# Patient Record
Sex: MALE
[De-identification: names, ages, dates, MRNs, and addresses within clinical notes are randomized per-mention and may not be internally consistent; named-entity substitution may affect disease eponyms.]

---

## 2024-02-15 ENCOUNTER — NON-APPOINTMENT (OUTPATIENT)
Age: 24
End: 2024-02-15

## 2024-02-15 ENCOUNTER — APPOINTMENT (OUTPATIENT)
Dept: INTERNAL MEDICINE | Facility: CLINIC | Age: 24
End: 2024-02-15
Payer: COMMERCIAL

## 2024-02-15 ENCOUNTER — TRANSCRIPTION ENCOUNTER (OUTPATIENT)
Age: 24
End: 2024-02-15

## 2024-02-15 VITALS
OXYGEN SATURATION: 99 % | HEART RATE: 58 BPM | BODY MASS INDEX: 34.57 KG/M2 | HEIGHT: 75 IN | SYSTOLIC BLOOD PRESSURE: 151 MMHG | DIASTOLIC BLOOD PRESSURE: 90 MMHG | WEIGHT: 278 LBS | TEMPERATURE: 98.7 F

## 2024-02-15 DIAGNOSIS — Z80.7 FAMILY HISTORY OF OTHER MALIGNANT NEOPLASMS OF LYMPHOID, HEMATOPOIETIC AND RELATED TISSUES: ICD-10-CM

## 2024-02-15 DIAGNOSIS — Z76.89 PERSONS ENCOUNTERING HEALTH SERVICES IN OTHER SPECIFIED CIRCUMSTANCES: ICD-10-CM

## 2024-02-15 DIAGNOSIS — Z80.42 FAMILY HISTORY OF MALIGNANT NEOPLASM OF PROSTATE: ICD-10-CM

## 2024-02-15 DIAGNOSIS — Z80.3 FAMILY HISTORY OF MALIGNANT NEOPLASM OF BREAST: ICD-10-CM

## 2024-02-15 DIAGNOSIS — R21 RASH AND OTHER NONSPECIFIC SKIN ERUPTION: ICD-10-CM

## 2024-02-15 PROBLEM — Z00.00 ENCOUNTER FOR PREVENTIVE HEALTH EXAMINATION: Status: ACTIVE | Noted: 2024-02-15

## 2024-02-15 PROCEDURE — 99203 OFFICE O/P NEW LOW 30 MIN: CPT

## 2024-02-15 NOTE — HEALTH RISK ASSESSMENT
[Yes] : Yes [2 - 4 times a month (2 pts)] : 2-4 times a month (2 points) [3 or 4 (1 pt)] : 3 or 4  (1 point) [Less than monthly (1 pt)] : Less than monthly (1 point) [No] : In the past 12 months have you used drugs other than those required for medical reasons? No [Employed] : employed [Student] : student [Single] : single [Fully functional (bathing, dressing, toileting, transferring, walking, feeding)] : Fully functional (bathing, dressing, toileting, transferring, walking, feeding) [Fully functional (using the telephone, shopping, preparing meals, housekeeping, doing laundry, using] : Fully functional and needs no help or supervision to perform IADLs (using the telephone, shopping, preparing meals, housekeeping, doing laundry, using transportation, managing medications and managing finances) [Never] : Never [Audit-CScore] : 4 [Sexually Active] : not sexually active

## 2024-02-15 NOTE — PHYSICAL EXAM
[Normal] : normal rate, regular rhythm, normal S1 and S2 and no murmur heard [de-identified] : Scattered pustules in the inner thigh, one pustule above the groin

## 2024-02-15 NOTE — HISTORY OF PRESENT ILLNESS
[de-identified] : Mr Moore is a 23 YOM woth no knon PMHx. Reports ingrown hair in his groin for a few months. There is a small lump with no itching or pain. No other rash. No new soaps, skin care products.  He does occasionally feel down and depressed. He attributes this to studying and school. No thoughts of self harm, no Hx of self harm.

## 2024-04-01 ENCOUNTER — APPOINTMENT (OUTPATIENT)
Dept: INTERNAL MEDICINE | Facility: CLINIC | Age: 24
End: 2024-04-01
Payer: COMMERCIAL

## 2024-04-01 ENCOUNTER — NON-APPOINTMENT (OUTPATIENT)
Age: 24
End: 2024-04-01

## 2024-04-01 VITALS — SYSTOLIC BLOOD PRESSURE: 135 MMHG | DIASTOLIC BLOOD PRESSURE: 78 MMHG

## 2024-04-01 VITALS — SYSTOLIC BLOOD PRESSURE: 134 MMHG | DIASTOLIC BLOOD PRESSURE: 77 MMHG

## 2024-04-01 VITALS
WEIGHT: 265 LBS | SYSTOLIC BLOOD PRESSURE: 161 MMHG | DIASTOLIC BLOOD PRESSURE: 85 MMHG | TEMPERATURE: 98.1 F | BODY MASS INDEX: 32.95 KG/M2 | OXYGEN SATURATION: 100 % | HEART RATE: 55 BPM | HEIGHT: 75 IN

## 2024-04-01 DIAGNOSIS — R03.0 ELEVATED BLOOD-PRESSURE READING, W/OUT DIAGNOSIS OF HYPERTENSION: ICD-10-CM

## 2024-04-01 DIAGNOSIS — Z00.00 ENCOUNTER FOR GENERAL ADULT MEDICAL EXAMINATION W/OUT ABNORMAL FINDINGS: ICD-10-CM

## 2024-04-01 DIAGNOSIS — Z13.31 ENCOUNTER FOR SCREENING FOR DEPRESSION: ICD-10-CM

## 2024-04-01 PROCEDURE — 99395 PREV VISIT EST AGE 18-39: CPT

## 2024-04-01 RX ORDER — CLINDAMYCIN PHOSPHATE 1 G/10ML
1 GEL TOPICAL TWICE DAILY
Qty: 1 | Refills: 1 | Status: DISCONTINUED | COMMUNITY
Start: 2024-02-15 | End: 2024-04-01

## 2024-04-01 NOTE — HEALTH RISK ASSESSMENT
[Yes] : Yes [2 - 4 times a month (2 pts)] : 2-4 times a month (2 points) [3 or 4 (1 pt)] : 3 or 4  (1 point) [Never (0 pts)] : Never (0 points) [No] : In the past 12 months have you used drugs other than those required for medical reasons? No [0] : 2) Feeling down, depressed, or hopeless: Not at all (0) [PHQ-2 Negative - No further assessment needed] : PHQ-2 Negative - No further assessment needed [Student] : student [Single] : single [Fully functional (bathing, dressing, toileting, transferring, walking, feeding)] : Fully functional (bathing, dressing, toileting, transferring, walking, feeding) [Fully functional (using the telephone, shopping, preparing meals, housekeeping, doing laundry, using] : Fully functional and needs no help or supervision to perform IADLs (using the telephone, shopping, preparing meals, housekeeping, doing laundry, using transportation, managing medications and managing finances) [Never] : Never [[id number]] : 0 [Audit-CScore] : 3 [Change in mental status noted] : No change in mental status noted [Reports changes in hearing] : Reports no changes in hearing [Reports changes in vision] : Reports no changes in vision

## 2024-04-01 NOTE — HISTORY OF PRESENT ILLNESS
[de-identified] : Mr. Moore is a 23 YOM with no known PMHx. Presenting for CPE. Feels well. Needs form filled for school. He is in school for PT. Mood is better. His schedule is lighter. No longer feels depressed.  He has been tracking his BP at home and readings are frequently greater than 130/80.

## 2024-04-01 NOTE — REVIEW OF SYSTEMS
[Negative] : Heme/Lymph [FreeTextEntry8] : No nocturia of erectile dysfunction [FreeTextEntry9] : Chronic R shoulder discomfort.

## 2024-04-01 NOTE — PHYSICAL EXAM
[Normal Sclera/Conjunctiva] : normal sclera/conjunctiva [No Edema] : there was no peripheral edema [No Extremity Clubbing/Cyanosis] : no extremity clubbing/cyanosis [Normal] : soft, non-tender, non-distended, no masses palpated, no HSM and normal bowel sounds [No Joint Swelling] : no joint swelling [Grossly Normal Strength/Tone] : grossly normal strength/tone [Coordination Grossly Intact] : coordination grossly intact [No Focal Deficits] : no focal deficits [Normal Gait] : normal gait [de-identified] : Full ROM BL UE. Mild discomfort with R external rotation.

## 2024-04-03 ENCOUNTER — TRANSCRIPTION ENCOUNTER (OUTPATIENT)
Age: 24
End: 2024-04-03

## 2024-04-03 LAB
ALBUMIN SERPL ELPH-MCNC: 4.9 G/DL
ALDOSTERONE SERUM: 23 NG/DL
ALP BLD-CCNC: 84 U/L
ALT SERPL-CCNC: 23 U/L
ANION GAP SERPL CALC-SCNC: 10 MMOL/L
APPEARANCE: CLEAR
AST SERPL-CCNC: 22 U/L
BACTERIA: NEGATIVE /HPF
BILIRUB SERPL-MCNC: 0.5 MG/DL
BILIRUBIN URINE: NEGATIVE
BLOOD URINE: NEGATIVE
BUN SERPL-MCNC: 13 MG/DL
CALCIUM SERPL-MCNC: 9.8 MG/DL
CAST: 0 /LPF
CHLORIDE SERPL-SCNC: 104 MMOL/L
CO2 SERPL-SCNC: 27 MMOL/L
COLOR: YELLOW
CREAT SERPL-MCNC: 1.05 MG/DL
CREAT SPEC-SCNC: 135 MG/DL
EGFR: 76 ML/MIN/1.73M2
EPITHELIAL CELLS: 2 /HPF
GLUCOSE QUALITATIVE U: NEGATIVE MG/DL
GLUCOSE SERPL-MCNC: 92 MG/DL
KETONES URINE: NEGATIVE MG/DL
LEUKOCYTE ESTERASE URINE: ABNORMAL
MICROALBUMIN 24H UR DL<=1MG/L-MCNC: <1.2 MG/DL
MICROALBUMIN/CREAT 24H UR-RTO: NORMAL MG/G
MICROSCOPIC-UA: NORMAL
NITRITE URINE: NEGATIVE
PH URINE: 6
POTASSIUM SERPL-SCNC: 4.5 MMOL/L
PROT SERPL-MCNC: 7.2 G/DL
PROTEIN URINE: NEGATIVE MG/DL
RED BLOOD CELLS URINE: 0 /HPF
SODIUM SERPL-SCNC: 141 MMOL/L
SPECIFIC GRAVITY URINE: 1.02
TSH SERPL-ACNC: 1.44 UIU/ML
UROBILINOGEN URINE: 0.2 MG/DL
WHITE BLOOD CELLS URINE: 10 /HPF

## 2024-04-05 ENCOUNTER — APPOINTMENT (OUTPATIENT)
Dept: INTERNAL MEDICINE | Facility: CLINIC | Age: 24
End: 2024-04-05

## 2024-04-05 LAB
BASOPHILS # BLD AUTO: 0.03 K/UL
BASOPHILS NFR BLD AUTO: 0.5 %
CHOLEST SERPL-MCNC: 199 MG/DL
EOSINOPHIL # BLD AUTO: 0.15 K/UL
EOSINOPHIL NFR BLD AUTO: 2.4 %
HCT VFR BLD CALC: 44.9 %
HDLC SERPL-MCNC: 55 MG/DL
HGB BLD-MCNC: 15.5 G/DL
IMM GRANULOCYTES NFR BLD AUTO: 0.2 %
LDLC SERPL CALC-MCNC: 131 MG/DL
LYMPHOCYTES # BLD AUTO: 1.63 K/UL
LYMPHOCYTES NFR BLD AUTO: 26.5 %
MAN DIFF?: NORMAL
MCHC RBC-ENTMCNC: 30.1 PG
MCHC RBC-ENTMCNC: 34.5 GM/DL
MCV RBC AUTO: 87.2 FL
MONOCYTES # BLD AUTO: 0.5 K/UL
MONOCYTES NFR BLD AUTO: 8.1 %
NEUTROPHILS # BLD AUTO: 3.82 K/UL
NEUTROPHILS NFR BLD AUTO: 62.3 %
NONHDLC SERPL-MCNC: 144 MG/DL
PLATELET # BLD AUTO: 323 K/UL
RBC # BLD: 5.15 M/UL
RBC # FLD: 12.5 %
TRIGL SERPL-MCNC: 71 MG/DL
WBC # FLD AUTO: 6.14 K/UL

## 2024-04-08 ENCOUNTER — TRANSCRIPTION ENCOUNTER (OUTPATIENT)
Age: 24
End: 2024-04-08

## 2024-04-08 LAB — RENIN ACTIVITY, PLASMA: 3.21 NG/ML/HR

## 2024-04-09 LAB
METANEPHRINE, PL: 31.9 PG/ML
NORMETANEPHRINE, PL: 52 PG/ML

## 2024-04-18 ENCOUNTER — APPOINTMENT (OUTPATIENT)
Dept: INTERNAL MEDICINE | Facility: CLINIC | Age: 24
End: 2024-04-18

## 2024-04-18 DIAGNOSIS — R82.81 PYURIA: ICD-10-CM

## 2024-05-03 LAB
APPEARANCE: CLEAR
BACTERIA UR CULT: NORMAL
BACTERIA: NEGATIVE /HPF
BILIRUBIN URINE: NEGATIVE
BLOOD URINE: NEGATIVE
C TRACH RRNA SPEC QL NAA+PROBE: NOT DETECTED
CAST: 0 /LPF
COLOR: YELLOW
EPITHELIAL CELLS: 0 /HPF
GLUCOSE QUALITATIVE U: NEGATIVE MG/DL
KETONES URINE: NEGATIVE MG/DL
LEUKOCYTE ESTERASE URINE: NEGATIVE
MICROSCOPIC-UA: NORMAL
N GONORRHOEA RRNA SPEC QL NAA+PROBE: NOT DETECTED
NITRITE URINE: NEGATIVE
PH URINE: 7
PROTEIN URINE: NEGATIVE MG/DL
RED BLOOD CELLS URINE: 0 /HPF
SOURCE AMPLIFICATION: NORMAL
SOURCE AMPLIFICATION: NORMAL
SPECIFIC GRAVITY URINE: 1.01
T VAGINALIS RRNA SPEC QL NAA+PROBE: NOT DETECTED
UROBILINOGEN URINE: 0.2 MG/DL
WHITE BLOOD CELLS URINE: 0 /HPF

## 2024-09-26 ENCOUNTER — APPOINTMENT (OUTPATIENT)
Dept: INTERNAL MEDICINE | Facility: CLINIC | Age: 24
End: 2024-09-26
Payer: COMMERCIAL

## 2024-09-26 VITALS
DIASTOLIC BLOOD PRESSURE: 79 MMHG | BODY MASS INDEX: 32.58 KG/M2 | SYSTOLIC BLOOD PRESSURE: 161 MMHG | HEIGHT: 75 IN | OXYGEN SATURATION: 97 % | HEART RATE: 62 BPM | WEIGHT: 262 LBS

## 2024-09-26 VITALS — DIASTOLIC BLOOD PRESSURE: 85 MMHG | SYSTOLIC BLOOD PRESSURE: 148 MMHG | HEART RATE: 64 BPM | OXYGEN SATURATION: 100 %

## 2024-09-26 DIAGNOSIS — R03.0 ELEVATED BLOOD-PRESSURE READING, W/OUT DIAGNOSIS OF HYPERTENSION: ICD-10-CM

## 2024-09-26 DIAGNOSIS — M79.632 PAIN IN LEFT FOREARM: ICD-10-CM

## 2024-09-26 PROCEDURE — G2211 COMPLEX E/M VISIT ADD ON: CPT

## 2024-09-26 PROCEDURE — 99213 OFFICE O/P EST LOW 20 MIN: CPT

## 2024-10-14 ENCOUNTER — APPOINTMENT (OUTPATIENT)
Dept: INTERNAL MEDICINE | Facility: CLINIC | Age: 24
End: 2024-10-14
Payer: COMMERCIAL

## 2024-10-14 VITALS — DIASTOLIC BLOOD PRESSURE: 83 MMHG | SYSTOLIC BLOOD PRESSURE: 167 MMHG

## 2024-10-14 VITALS
WEIGHT: 259.8 LBS | HEART RATE: 68 BPM | SYSTOLIC BLOOD PRESSURE: 164 MMHG | BODY MASS INDEX: 32.47 KG/M2 | DIASTOLIC BLOOD PRESSURE: 92 MMHG | OXYGEN SATURATION: 100 %

## 2024-10-14 DIAGNOSIS — M79.632 PAIN IN LEFT FOREARM: ICD-10-CM

## 2024-10-14 DIAGNOSIS — R03.0 ELEVATED BLOOD-PRESSURE READING, W/OUT DIAGNOSIS OF HYPERTENSION: ICD-10-CM

## 2024-10-14 PROCEDURE — S0257: CPT

## 2024-10-14 PROCEDURE — 99213 OFFICE O/P EST LOW 20 MIN: CPT

## 2024-10-14 RX ORDER — PSYLLIUM HUSK 0.4 G
CAPSULE ORAL
Refills: 0 | Status: ACTIVE | COMMUNITY

## 2024-10-14 RX ORDER — VITAMIN B COMPLEX
CAPSULE ORAL
Refills: 0 | Status: ACTIVE | COMMUNITY

## 2025-06-11 ENCOUNTER — APPOINTMENT (OUTPATIENT)
Dept: INTERNAL MEDICINE | Facility: CLINIC | Age: 25
End: 2025-06-11
Payer: MEDICAID

## 2025-06-11 VITALS
HEIGHT: 75 IN | SYSTOLIC BLOOD PRESSURE: 167 MMHG | BODY MASS INDEX: 30.84 KG/M2 | DIASTOLIC BLOOD PRESSURE: 79 MMHG | HEART RATE: 87 BPM | OXYGEN SATURATION: 96 % | WEIGHT: 248 LBS

## 2025-06-11 PROCEDURE — 81003 URINALYSIS AUTO W/O SCOPE: CPT | Mod: QW

## 2025-06-11 PROCEDURE — 99395 PREV VISIT EST AGE 18-39: CPT

## 2025-06-12 ENCOUNTER — TRANSCRIPTION ENCOUNTER (OUTPATIENT)
Age: 25
End: 2025-06-12

## 2025-06-12 LAB
ALBUMIN SERPL ELPH-MCNC: 4.9 G/DL
ALP BLD-CCNC: 96 U/L
ALT SERPL-CCNC: 32 U/L
ANION GAP SERPL CALC-SCNC: 13 MMOL/L
APPEARANCE: CLEAR
AST SERPL-CCNC: 24 U/L
BACTERIA: NEGATIVE /HPF
BASOPHILS # BLD AUTO: 0.08 K/UL
BASOPHILS NFR BLD AUTO: 1.1 %
BILIRUB DIRECT SERPL-MCNC: 0.16 MG/DL
BILIRUB INDIRECT SERPL-MCNC: 0.3 MG/DL
BILIRUB SERPL-MCNC: 0.4 MG/DL
BILIRUBIN URINE: NEGATIVE
BLOOD URINE: NEGATIVE
BUN SERPL-MCNC: 15 MG/DL
CALCIUM SERPL-MCNC: 9.8 MG/DL
CAST: 0 /LPF
CHLORIDE SERPL-SCNC: 102 MMOL/L
CHOLEST SERPL-MCNC: 192 MG/DL
CO2 SERPL-SCNC: 25 MMOL/L
COLOR: YELLOW
CREAT SERPL-MCNC: 1.23 MG/DL
EGFRCR SERPLBLD CKD-EPI 2021: 83 ML/MIN/1.73M2
EOSINOPHIL # BLD AUTO: 0.15 K/UL
EOSINOPHIL NFR BLD AUTO: 2 %
EPITHELIAL CELLS: 2 /HPF
GLUCOSE QUALITATIVE U: NEGATIVE MG/DL
GLUCOSE SERPL-MCNC: 84 MG/DL
HCT VFR BLD CALC: 46.5 %
HDLC SERPL-MCNC: 67 MG/DL
HGB BLD-MCNC: 15.5 G/DL
IMM GRANULOCYTES NFR BLD AUTO: 0.3 %
KETONES URINE: NEGATIVE MG/DL
LDLC SERPL-MCNC: 116 MG/DL
LEUKOCYTE ESTERASE URINE: NEGATIVE
LYMPHOCYTES # BLD AUTO: 1.55 K/UL
LYMPHOCYTES NFR BLD AUTO: 20.9 %
MAN DIFF?: NORMAL
MCHC RBC-ENTMCNC: 30.3 PG
MCHC RBC-ENTMCNC: 33.3 G/DL
MCV RBC AUTO: 91 FL
MICROSCOPIC-UA: NORMAL
MONOCYTES # BLD AUTO: 0.72 K/UL
MONOCYTES NFR BLD AUTO: 9.7 %
NEUTROPHILS # BLD AUTO: 4.88 K/UL
NEUTROPHILS NFR BLD AUTO: 66 %
NITRITE URINE: NEGATIVE
NONHDLC SERPL-MCNC: 125 MG/DL
PH URINE: 7
PLATELET # BLD AUTO: 343 K/UL
POTASSIUM SERPL-SCNC: 4.4 MMOL/L
PROT SERPL-MCNC: 7.2 G/DL
PROTEIN URINE: NEGATIVE MG/DL
RBC # BLD: 5.11 M/UL
RBC # FLD: 13.5 %
RED BLOOD CELLS URINE: 0 /HPF
SODIUM SERPL-SCNC: 140 MMOL/L
SPECIFIC GRAVITY URINE: 1.02
TRIGL SERPL-MCNC: 49 MG/DL
TSH SERPL-ACNC: 1.03 UIU/ML
UROBILINOGEN URINE: 0.2 MG/DL
WBC # FLD AUTO: 7.4 K/UL
WHITE BLOOD CELLS URINE: 2 /HPF